# Patient Record
Sex: FEMALE | Race: OTHER | Employment: PART TIME | URBAN - METROPOLITAN AREA
[De-identification: names, ages, dates, MRNs, and addresses within clinical notes are randomized per-mention and may not be internally consistent; named-entity substitution may affect disease eponyms.]

---

## 2021-10-15 ENCOUNTER — HOSPITAL ENCOUNTER (OUTPATIENT)
Age: 46
Setting detail: OBSERVATION
LOS: 1 days | Discharge: HOME | End: 2021-10-16
Attending: INTERNAL MEDICINE | Admitting: INTERNAL MEDICINE
Payer: COMMERCIAL

## 2021-10-15 DIAGNOSIS — R07.2 PRECORDIAL PAIN: ICD-10-CM

## 2021-10-15 PROBLEM — R07.9 CHEST PAIN: Status: ACTIVE | Noted: 2021-10-15

## 2021-10-15 LAB
ATRIAL RATE: 60 BPM
CALCULATED P AXIS, ECG09: 62 DEGREES
CALCULATED R AXIS, ECG10: 75 DEGREES
CALCULATED T AXIS, ECG11: 68 DEGREES
DIAGNOSIS, 93000: NORMAL
GLUCOSE BLD STRIP.AUTO-MCNC: 92 MG/DL (ref 65–100)
P-R INTERVAL, ECG05: 156 MS
Q-T INTERVAL, ECG07: 418 MS
QRS DURATION, ECG06: 92 MS
QTC CALCULATION (BEZET), ECG08: 418 MS
SERVICE CMNT-IMP: NORMAL
TROPONIN-HIGH SENSITIVITY: 5.2 PG/ML (ref 0–14)
TROPONIN-HIGH SENSITIVITY: 5.7 PG/ML (ref 0–14)
VENTRICULAR RATE, ECG03: 60 BPM

## 2021-10-15 PROCEDURE — 99220 PR INITIAL OBSERVATION CARE/DAY 70 MINUTES: CPT | Performed by: INTERNAL MEDICINE

## 2021-10-15 PROCEDURE — 36415 COLL VENOUS BLD VENIPUNCTURE: CPT

## 2021-10-15 PROCEDURE — 93005 ELECTROCARDIOGRAM TRACING: CPT | Performed by: PHYSICIAN ASSISTANT

## 2021-10-15 PROCEDURE — 82962 GLUCOSE BLOOD TEST: CPT

## 2021-10-15 PROCEDURE — 84484 ASSAY OF TROPONIN QUANT: CPT

## 2021-10-15 PROCEDURE — 99218 HC RM OBSERVATION: CPT

## 2021-10-15 PROCEDURE — 2709999900 HC NON-CHARGEABLE SUPPLY

## 2021-10-15 RX ORDER — MORPHINE SULFATE 2 MG/ML
2 INJECTION, SOLUTION INTRAMUSCULAR; INTRAVENOUS
Status: DISCONTINUED | OUTPATIENT
Start: 2021-10-15 | End: 2021-10-16 | Stop reason: HOSPADM

## 2021-10-15 RX ORDER — HYDROCODONE BITARTRATE AND ACETAMINOPHEN 5; 325 MG/1; MG/1
1 TABLET ORAL
Status: DISCONTINUED | OUTPATIENT
Start: 2021-10-15 | End: 2021-10-16 | Stop reason: HOSPADM

## 2021-10-15 RX ORDER — NITROGLYCERIN 0.4 MG/1
0.4 TABLET SUBLINGUAL
Status: DISCONTINUED | OUTPATIENT
Start: 2021-10-15 | End: 2021-10-16 | Stop reason: HOSPADM

## 2021-10-15 RX ORDER — SODIUM CHLORIDE 0.9 % (FLUSH) 0.9 %
5-40 SYRINGE (ML) INJECTION EVERY 8 HOURS
Status: DISCONTINUED | OUTPATIENT
Start: 2021-10-15 | End: 2021-10-16 | Stop reason: HOSPADM

## 2021-10-15 RX ORDER — ACETAMINOPHEN 325 MG/1
650 TABLET ORAL
Status: DISCONTINUED | OUTPATIENT
Start: 2021-10-15 | End: 2021-10-16 | Stop reason: HOSPADM

## 2021-10-15 RX ADMIN — Medication 10 ML: at 21:10

## 2021-10-15 RX ADMIN — Medication 10 ML: at 18:21

## 2021-10-15 NOTE — ROUTINE PROCESS
Direct admission ambulatory to telemetry. MD and PA at bedside. Skin assessment:  Sacrum/heels intact with no redness noted.

## 2021-10-15 NOTE — PROGRESS NOTES
Patient seen and examined by me. Agree with above note by physician extender. Key findings are: Patient with a 1 to 2-minute episode of chest pressure yesterday afternoon. There were no aggravating or alleviating factors. Lasted only a few minutes at a time. She did not have any associated shortness of breath but did have some vomiting after the episode. She presented to outside emergency department today after developing numbness in her upper left arm with some mild discomfort in her chest.  She walked 30 minutes today without having any chest pain or shortness of breath. CV- RRR without murmur  Lungs- Clear bilaterally  Ext- no edema  EKG is normal    Plan: Chest pain. Atypical for myocardial ischemia with a normal EKG. Cycle serial cardiac enzymes and if negative stress testing will be appropriate. Patient is diabetic and I think should undergo early stress testing.

## 2021-10-15 NOTE — ROUTINE PROCESS
Bedside shift change report given to Tomas Plasencia RN (oncoming nurse) by myself (offgoing nurse). Report included the following information SBAR, Procedure Summary, Recent Results and Cardiac Rhythm . Raj Daily

## 2021-10-15 NOTE — H&P
[facility] Cardiology Initial Cardiac Evaluation      Date of  Admission: 10/15/2021  4:01 PM     Primary Care Physician: Vivi Pena MD  Primary Cardiologist: None  Referring Physician: Johanna Mckinnon  Supervising Physician: Dr Nelson Castro    CC/Reason for evaluation: chest pain    HPI:  Slim Mayo is a 55 y.o. female with PMH of diet controlled DM, pancreatic mass s/p partial pancreatectomy, pituitary tumor s/p subtotal resection, multidnodular goiter s/p partial thyroidectomy who presented to  with complaint of chest pain. Patient reports chest pain yesterday afternoon. Describes chest pain as pressure-like that lasted a couple minutes. Have nausea and vomiting x 1. No aggravating or alleviated factors. This morning patient states she walked and ran without any symptoms. Later in the day she developed left arm/hand tingling and had some chest discomfort therefore presented to  for further evaluation. Upon evaluation, EKG showed NSR. Labs showed WBC 5.5, H/H 13.8/40.3, Ptl 264, Na 137, K 4.5, BUN/Cr 14/0.7, glucose 126, troponin <0.05. Patient was transferred to [facility] for further cardiac evaluation. Currently chest pain free. Past Medical History:   Diagnosis Date    Anemia     Fatigue     Frequent urination     Gallstones     Heavy menses     Heavy menses     Irregular menstrual cycle     Shortness of breath     Vertigo       Past Surgical History:   Procedure Laterality Date    HX OTHER SURGICAL      removal of pituitary tumor       Allergies   Allergen Reactions    Pcn [Penicillins] Swelling      Social History     Socioeconomic History    Marital status: SINGLE     Spouse name: Not on file    Number of children: Not on file    Years of education: Not on file    Highest education level: Not on file   Occupational History    Not on file   Tobacco Use    Smoking status: Never Smoker    Smokeless tobacco: Never Used   Substance and Sexual Activity    Alcohol use:  No Alcohol/week: 0.0 standard drinks    Drug use: No    Sexual activity: Not on file   Other Topics Concern    Not on file   Social History Narrative    Not on file     Social Determinants of Health     Financial Resource Strain:     Difficulty of Paying Living Expenses:    Food Insecurity:     Worried About Running Out of Food in the Last Year:     [address] in the Last Year:    Transportation Needs:     Lack of Transportation (Medical):  Lack of Transportation (Non-Medical):    Physical Activity:     Days of Exercise per Week:     Minutes of Exercise per Session:    Stress:     Feeling of Stress :    Social Connections:     Frequency of Communication with Friends and Family:     Frequency of Social Gatherings with Friends and Family:     Attends Gnosticist Services:     Active Member of Clubs or Organizations:     Attends Club or Organization Meetings:     Marital Status:    Intimate Partner Violence:     Fear of Current or Ex-Partner:     Emotionally Abused:     Physically Abused:     Sexually Abused:      Family History   Problem Relation Age of Onset    Diabetes Maternal Grandmother     Diabetes Maternal Grandfather     Kidney Disease Maternal Grandfather     Anemia Other         aunt    Anemia Other         cousin        No current facility-administered medications for this encounter.      Review of Symptoms:    General: No weight changes,  weakness, fever or chills  Skin: no rashes, lumps, or other skin changes  HEENT: no headache, dizziness, lightheadedness, vision changes, hearing changes, tinnitus, vertigo, sinus pressure/pain, bleeding gums, sore throat, or hoarseness  Neck: no swollen glands, goiter, pain or stiffness  Respiratory: No cough, sputum, hemoptysis, dyspnea, wheezing  Cardiovascular: + as per HPI  Gastrointestinal: no GERD, constipation, diarrhea, liver problems, or h/o GI bleed  Urinary: no frequency, urgency , hematuria, burning/pain with urination, recent flank pain, polyuria, nocturia, or difficulty urinating  Peripheral Vascular: no claudication, leg cramps, prior DVTs, swelling of calves, legs, or feet, color change, or swelling with redness or tenderness  Musculoskeletal: no muscle or joint pain/stiffness, joint swelling, erythema of joints, or back pain  Psychiatric: no depression or excessive stress  Neurological: no sensory or motor loss, seizures, syncope, tremors, numbness, no dementia  Hematologic: no anemia, easy bruising or bleeding  Endocrine: Positive to diet controlled diabetes. No thyroid problems, heat or cold intolerance, excessive sweating, polyuria, polydipsia     Subjective:     Physical Exam:    Vitals:    10/15/21 1641   BP: 120/71   Pulse: 62   Resp: 18   Temp: 97.8 °F (36.6 °C)   SpO2: 97%       General: Well Developed, Well Nourished, No Acute Distress  HEENT: pupils equal and round, no abnormalities noted  Neck: supple, no JVD, no carotid bruits  Heart: S1S2 with RRR without murmurs or gallops  Lungs: Clear throughout auscultation bilaterally without adventitious sounds  Abd: soft, nontender, nondistended, with good bowel sounds  Ext: warm, no edema, calves supple/nontender, pulses 2+ bilaterally  Skin: warm and dry  Psychiatric: Normal mood and affect  Neurologic: Alert and oriented X 3    Cardiographics    Telemetry: normal sinus rhythm  ECG: normal sinus rhythm      Labs: as above    Pt has been seen and examined by Dr. Jodie Lozano. He agrees with the following assessment and plan. Assessment/Plan:      Chest pain  - appears atypical  - admit to telemetry  - trend hs-trop  - lipid panel in am  - NPO after midnight for stress test    DM  - diet controlled         Thank you for requesting cardiac evaluation and allowing us to participate in the care of this patient. We will continue to follow along with you.       Negro Wright PA-C  Supervising Physician: Dr oJdie Lozano

## 2021-10-16 VITALS
SYSTOLIC BLOOD PRESSURE: 110 MMHG | DIASTOLIC BLOOD PRESSURE: 73 MMHG | WEIGHT: 190.6 LBS | OXYGEN SATURATION: 98 % | RESPIRATION RATE: 20 BRPM | TEMPERATURE: 97.6 F | BODY MASS INDEX: 27.35 KG/M2 | HEART RATE: 55 BPM

## 2021-10-16 LAB
ANION GAP SERPL CALC-SCNC: 4 MMOL/L (ref 7–16)
BUN SERPL-MCNC: 14 MG/DL (ref 6–23)
CALCIUM SERPL-MCNC: 9.2 MG/DL (ref 8.3–10.4)
CHLORIDE SERPL-SCNC: 112 MMOL/L (ref 98–107)
CHOLEST SERPL-MCNC: 172 MG/DL
CO2 SERPL-SCNC: 25 MMOL/L (ref 21–32)
CREAT SERPL-MCNC: 0.56 MG/DL (ref 0.6–1)
ERYTHROCYTE [DISTWIDTH] IN BLOOD BY AUTOMATED COUNT: 15.1 % (ref 11.9–14.6)
GLUCOSE SERPL-MCNC: 124 MG/DL (ref 65–100)
HCT VFR BLD AUTO: 39.3 % (ref 35.8–46.3)
HDLC SERPL-MCNC: 59 MG/DL (ref 40–60)
HDLC SERPL: 2.9 {RATIO}
HGB BLD-MCNC: 12.8 G/DL (ref 11.7–15.4)
LDLC SERPL CALC-MCNC: 101 MG/DL
MCH RBC QN AUTO: 28.3 PG (ref 26.1–32.9)
MCHC RBC AUTO-ENTMCNC: 32.6 G/DL (ref 31.4–35)
MCV RBC AUTO: 86.8 FL (ref 79.6–97.8)
NRBC # BLD: 0 K/UL (ref 0–0.2)
PLATELET # BLD AUTO: 270 K/UL (ref 150–450)
PMV BLD AUTO: 10.8 FL (ref 9.4–12.3)
POTASSIUM SERPL-SCNC: 4 MMOL/L (ref 3.5–5.1)
RBC # BLD AUTO: 4.53 M/UL (ref 4.05–5.2)
SODIUM SERPL-SCNC: 141 MMOL/L (ref 136–145)
TRIGL SERPL-MCNC: 60 MG/DL (ref 35–150)
VLDLC SERPL CALC-MCNC: 12 MG/DL (ref 6–23)
WBC # BLD AUTO: 7 K/UL (ref 4.3–11.1)

## 2021-10-16 PROCEDURE — 80048 BASIC METABOLIC PNL TOTAL CA: CPT

## 2021-10-16 PROCEDURE — 36415 COLL VENOUS BLD VENIPUNCTURE: CPT

## 2021-10-16 PROCEDURE — 99217 PR OBSERVATION CARE DISCHARGE MANAGEMENT: CPT | Performed by: INTERNAL MEDICINE

## 2021-10-16 PROCEDURE — 80061 LIPID PANEL: CPT

## 2021-10-16 PROCEDURE — 85027 COMPLETE CBC AUTOMATED: CPT

## 2021-10-16 PROCEDURE — 99218 HC RM OBSERVATION: CPT

## 2021-10-16 RX ADMIN — Medication 10 ML: at 05:34

## 2021-10-16 NOTE — ROUTINE PROCESS
Bedside and Verbal shift change report given to myself (oncoming nurse) by Tonja Riley (offgoing nurse). Report included the following information SBAR, Kardex, Intake/Output, MAR, Recent Results and Med Rec Status.

## 2021-10-16 NOTE — PROGRESS NOTES
Discharge instructions reviewed with pt. Opportunity for questions provided. Pt voiced understanding of all discharge instructions. IV and tele box removed. Pt waiting on ride.

## 2021-10-16 NOTE — DISCHARGE SUMMARY
[address] Cardiology Discharge Summary     Patient ID:  Jarrod Tate  [id number]  55 y.o.  1975    Admit date: 10/15/2021    Discharge date:  10/16/2021    Admitting Physician: Elmira Sky MD     Discharge Physician: Nancy Pagan NP/Dr. Dayron Mack    Admission Diagnoses: Chest pain [R07.9]    Discharge Diagnoses:    Diagnosis    Chest pain    Iron deficiency anemia       Cardiology Procedures this admission:  None  Consults: None    Hospital Course: Patient presented to  with complaint of chest pain. Patient reports chest pain yesterday afternoon. Describes chest pain as pressure-like that lasted a couple minutes. Have nausea and vomiting x 1. No aggravating or alleviated factors. The morning of admission patient stated she walked and ran without any symptoms. Later in the day she developed left arm/hand tingling and had some chest discomfort therefore presented to [facility] for further evaluation. trop <0.05 @  and hs trop 5.2 and 5.7 here. Patient had no further chest pain. Given negative troponin levels x 3, normal EKG and no further symptoms the decision was made to discharge patient and have NST as OP. The morning of discharge, patient was up feeling well without any complaints of chest pain or shortness of breath. Patient will have nuclear stress test in office. DISPOSITION: The patient is being discharged home in stable condition on a low saturated fat, low cholesterol and low salt diet. . The patient is instructed to call the office or return to the ER for immediate evaluation for any shortness of breath or chest pain not relieved by NTG. Discharge Exam:   Visit Vitals  /73   Pulse (!) 55   Temp 97.6 °F (36.4 °C)   Resp 20   Wt 86.5 kg (190 lb 9.6 oz)   SpO2 98%   BMI 27.35 kg/m²     Patient has been seen by Dr. Dayron Mack: see his progress note for exam details.     Recent Results (from the past 24 hour(s))   EKG, 12 LEAD, INITIAL Collection Time: 10/15/21  4:26 PM   Result Value Ref Range    Ventricular Rate 60 BPM    Atrial Rate 60 BPM    P-R Interval 156 ms    QRS Duration 92 ms    Q-T Interval 418 ms    QTC Calculation (Bezet) 418 ms    Calculated P Axis 62 degrees    Calculated R Axis 75 degrees    Calculated T Axis 68 degrees    Diagnosis       Normal sinus rhythm  Cannot rule out Anterior infarct , age undetermined  Abnormal ECG  When compared with ECG of 24-DEC-2016 12:34,  No significant change was found  Confirmed by Karly Hodges ([id number]) on 10/15/2021 4:59:48 PM     GLUCOSE, POC    Collection Time: 10/15/21  4:27 PM   Result Value Ref Range    Glucose (POC) 92 65 - 100 mg/dL    Performed by [facility]    TROPONIN-HIGH SENSITIVITY    Collection Time: 10/15/21  4:37 PM   Result Value Ref Range    Troponin-High Sensitivity 5.2 0 - 14 pg/mL   TROPONIN-HIGH SENSITIVITY    Collection Time: 10/15/21  7:25 PM   Result Value Ref Range    Troponin-High Sensitivity 5.7 0 - 14 pg/mL   METABOLIC PANEL, BASIC    Collection Time: 10/16/21  4:59 AM   Result Value Ref Range    Sodium 141 136 - 145 mmol/L    Potassium 4.0 3.5 - 5.1 mmol/L    Chloride 112 (H) 98 - 107 mmol/L    CO2 25 21 - 32 mmol/L    Anion gap 4 (L) 7 - 16 mmol/L    Glucose 124 (H) 65 - 100 mg/dL    BUN 14 6 - 23 MG/DL    Creatinine 0.56 (L) 0.6 - 1.0 MG/DL    GFR est AA >60 >60 ml/min/1.73m2    GFR est non-AA >60 >60 ml/min/1.73m2    Calcium 9.2 8.3 - 10.4 MG/DL   LIPID PANEL    Collection Time: 10/16/21  4:59 AM   Result Value Ref Range    Cholesterol, total 172 <200 MG/DL    Triglyceride 60 35 - 150 MG/DL    HDL Cholesterol 59 40 - 60 MG/DL    LDL, calculated 101 (H) <100 MG/DL    VLDL, calculated 12 6.0 - 23.0 MG/DL    CHOL/HDL Ratio 2.9     CBC W/O DIFF    Collection Time: 10/16/21  4:59 AM   Result Value Ref Range    WBC 7.0 4.3 - 11.1 K/uL    RBC 4.53 4.05 - 5.2 M/uL    HGB 12.8 11.7 - 15.4 g/dL    HCT 39.3 35.8 - 46.3 %    MCV 86.8 79.6 - 97.8 FL    MCH 28.3 26.1 - 32.9 PG    MCHC 32.6 31.4 - 35.0 g/dL    RDW 15.1 (H) 11.9 - 14.6 %    PLATELET 255 029 - 813 K/uL    MPV 10.8 9.4 - 12.3 FL    ABSOLUTE NRBC 0.00 0.0 - 0.2 K/uL         Patient Instructions: There are no discharge medications for this patient.         Signed:  Kalyan Loo NP  10/16/2021  9:13 AM

## 2021-10-16 NOTE — DISCHARGE INSTRUCTIONS
Patient Education        Dolor de pecho: Instrucciones de cuidado  Chest Pain: Care Instructions  Instrucciones de cuidado    El dolor de pecho puede tener muchas causas. Algunas no son graves y mejorarán por sí solas en pocos días. William algunos tipos de dolor de pecho requieren más pruebas y [place]. Es posible que jerry médico le haya recomendado shavonne visita de seguimiento dentro de las 8 a 12 horas siguientes. Si no mejora, es posible que necesite [address] pruebas o [place]. Aunque jerry médico le haya dado de akash, es necesario que esté atento a cualquier problema que se presente. El médico le hizo un cuidadoso chequeo, william a veces los problemas pueden aparecer posteriormente. Si tiene nuevos síntomas o éstos no mejoran, obtenga atención médica de inmediato. Si tiene dolor o presión en el pecho que empeora o es diferente y que dura más de 5 minutos, o se desmayó (perdió el conocimiento), llame al 911 o busque otra ayuda de emergencia de inmediato. Acudir a shavonne consulta médica es sólo un paso en jerry tratamiento. Aunque se sienta mejor, todavía deberá hacer lo que jerry médico le recomiende, serina asistir a todas las visitas de seguimiento sugeridas y linda los medicamentos exactamente [employer]	[place] fueron indicados. [place] le ayudará a recuperarse y prevenir problemas futuros. ¿Cómo puede cuidarse en el hogar? · Descanse hasta que se sienta mejor. · Talmo galo medicamentos exactamente serina le fueron recetados. Llame a jerry médico si ziyad estar teniendo problemas con jerry medicamento. · No conduzca después de linda un analgésico (medicamento para el dolor) recetado. ¿Cuándo debe pedir ayuda? Llame al 911 si:     · Se desmayó (perdió el conocimiento).     · Tiene graves dificultades para respirar.     · Tiene síntomas de un ataque al corazón. Estos podrían incluir:  ? Vertis Ends en el pecho, o shavonne sensación extraña en el pecho.  ? Sudoración. ? Falta de aire. ? Náuseas o vómito.   ? Fredderick Mallet shavonne sensación extraña en la espalda, el haseeb, la mandíbula, la parte superior del abdomen o en isabella o ambos hombros o brazos. ? Aturdimiento o debilidad repentina. ? Latidos del corazón rápidos o irregulares. Después de llamar al 911, es posible que el operador le diga que mastique 1 aspirina para adultos o de 2 a 4 aspirinas de dosis baja. Espere a shavonne ambulancia. No intente conducir usted mismo. Llame hoy a jerry médico si:     · Tiene cualquier dificultad para respirar.     · El dolor en el pecho empeora.     · Siente mareos o aturdimiento, o que está a punto de desmayarse.     · No mejora serina se esperaba.     · Tiene dolor de pecho nuevo o diferente. ¿Dónde puede encontrar más información en inglés? Shavonne Govind a http://www.gray.com/  Escriba A120 en la búsqueda para aprender más acerca de \"Dolor de pecho: Instrucciones de cuidado. \"  Revisado: 1 julio, 9485               [facility] del contenido: 13.0  © 2006-2021 Healthwise, Incorporated. Las instrucciones de cuidado fueron adaptadas bajo licencia por [employer] (which disclaims liability or warranty for this information). Si usted tiene [place] afección médica o sobre estas instrucciones, siempre pregunte a jerry profesional de medhat. Healthwise, Incorporated niega toda garantía o responsabilidad por jerry uso de esta información. Patient Education        Gammagrafía de perfusión cardíaca: Acerca de esta prueba  Cardiac Perfusion Scan: About This Test  ¿Qué es? Shavonne gammagrafía de perfusión cardíaca mide la cantidad de lisa en el músculo cardíaco en reposo y después de que se haya obligado al corazón a realizar un esfuerzo. Pueden usarse medicamentos o ejercicio para aumentar la cantidad de lisa que el corazón necesita. Adrian la gammagrafía, shavonne cámara janelle imágenes del corazón después de que le introduzcan un marcador radiactivo en shavonne vena del brazo. El marcador se desplaza por la lisa hasta el corazón.  A medida que el marcador pasa por el corazón, las zonas que tienen shavonne buena irrigación sanguínea absorben el marcador. Las zonas que no absorben el marcador podrían no estar recibiendo suficiente lisa o rojas resultado dañadas debido a un ataque cardíaco. Las imágenes muestran la diferencia. Se pueden linda dos series de imágenes syed la prueba. Heaven Reek serie se janelle mientras usted está en reposo. Otra serie se janelle después de que se haya obligado al corazón a realizar un mayor esfuerzo (esto se llama prueba de esfuerzo). ¿Por qué se hace esta prueba? La prueba suele hacerse para averiguar qué puede estar causando el dolor o la presión en el pecho. Puede hacerse después de un ataque cardíaco para kael si hay zonas del corazón que no reciben suficiente lisa. También puede usarse para averiguar la extensión del daño al corazón debido al ataque cardíaco.  ¿Cómo se prepara usted para la prueba? Informe a jerry médico [employer], vitaminas, suplementos y boston herbarios que janelle. Algunos pueden aumentar el riesgo de problemas syed jerry prueba. Jerry médico le dirá si debería dejar de linda cualquiera de ellos antes de la prueba y cuándo debe hacerlo. Si janelle aspirina o cualquier otro medicamento que previene los coágulos de [place]ole[place], pregúntele a jerry médico si debería dejar de tomarlo antes de jerry prueba. Asegúrese de entender exactamente lo que jerry médico quiere que prem. Estos medicamentos aumentan el riesgo de sangrado. Tayler vez le indiquen que no coma ni johanna syed varias horas antes de la prueba. Es posible que le pidan que evite el alcohol, el tabaco y las bebidas que contienen cafeína syed al menos 24 horas antes de la prueba. Use calzado cómodo, serina zapatillas para correr y [employer] o cortos que gulshan holgados. No lleve puestas joyas para esta prueba.   Si está en período de lactancia, tayler vez desee sacarse suficiente leche antes de la prueba para que pueda alimentar a jerry bebé syed 1 o 2 dillon. El marcador radiactivo utilizado en esta prueba puede ingresar en la [employer] y no es cm para el bebé. ¿Cómo se hace la prueba? Sangeeta Yo de perfusión cardíaca puede hacerse mientras está en reposo, después de hacer ejercicio o después de linda un medicamento. O pueden hacerle la prueba después de linda un medicamento y hacer ejercicio. Antes de las exploraciones, le conectarán electrodos al pecho para ayudar a registrar los latidos cardíacos. Benuel Lunch un tubo, llamado vía intravenosa, en el brazo. Le introducirán un marcador radiactivo en la vía intravenosa. · Para la exploración en reposo, se recostará sobre shavonne mireles. Lucia Councilman sobre el pecho registrará el marcador que se ha desplazado por la lisa hasta el músculo cardíaco. Se tomarán varias proyecciones. Cada shavonne lleva entre 10 y 27 minutos. · Para shavonne exploración de esfuerzo, caminará sobre un caminador mecánico o pedaleará en shavonne Brooklynn Mote. A continuación le harán la exploración. · Para shavonne exploración con [employer], le administrarán un medicamento en la vía intravenosa que aumenta la cantidad de lisa que necesita el corazón. Entonces le harán la exploración. ¿Cuánto tiempo dura shavonne gammagrafía de perfusión cardíaca? · Cada gammagrafía puede durar entre 27 y 61 minutos, aproximadamente. · El tiempo que tome la prueba dependerá de cuántas gammagrafías le heather y cuánto tiempo espere entre ellas. ¿Cuáles son los riesgos de shavonne gammagrafía de perfusión cardíaca? Las gammagrafías de perfusión cardíaca suelen ser [place]. Lajuanda Herberth que se exponga a radiación, existe shavonne pequeña probabilidad de daño a células o tejidos. [place] es el zacarias incluso con el marcador radioactivo de bajo nivel que se [place] para esta prueba. William la probabilidad de daño es muy baja en comparación con los beneficios de la prueba. Habrá algunos riesgos cuando la prueba use el ejercicio o medicamentos para hacer que el corazón se esfuerce.  El Faustina de riesgo depende del estado de jerry corazón y de jerry estado de medhat general. Los riesgos incluyen:  · [employer]. · Dolor en el pecho. · Un latido cardíaco irregular. · Ataque cardíaco. Hay un ligero riesgo de muerte si se produce un ataque cardíaco syed la prueba. ¿Qué ocurre después de la prueba? Puede regresar a casa y retomar galo actividades habituales de inmediato, a menos que ya esté ingresado en el hospital.  ¿Cómo puede cuidarse en el hogar? · [place] mucho líquido syed las siguientes 24 horas para ayudar a eliminar el marcador del organismo. Si tiene shavonne enfermedad renal, cardíaca o hepática y tiene que restringir los líquidos, hable con jerry médico antes de aumentar la cantidad de líquido que charli. · Usted eliminará la mayor parte del marcador del organismo a través de la orina o las heces en el término de un día. Por lo tanto, asegúrese de tirar la shaffer inmediatamente después de usar el baño y de lavarse bello las stanislav con agua y Cris. La cantidad de radiación en el marcador es muy pequeña. Eso significa que no es un riesgo para las personas que estén en contacto con usted después de la prueba. · No amamante a jerry bebé por 1 o 2 días después de esta prueba. Syed choco [place], puede darle a jerry bebé [facility] que haya barboza antes de la prueba, o puede darle [place]. ¿Cuándo debe pedir ayuda? Llame al 911 en cualquier momento que considere que necesita atención de [place]. Por ejemplo, llame si:  · Se desmayó (perdió el conocimiento). · Le dooley diagnosticado angina y tiene síntomas de angina que no desaparecen con reposo o que no mejoran a los 5 minutos de rojas tomado shavonne dosis de nitroglicerina. · Tiene síntomas de un ataque al corazón. Estos pueden incluir:  ? Gillie Pacific en el pecho, o shavonne sensación extraña en el pecho.  ? Sudoración. ? Falta de aire. ? Náuseas o vómito.   ? Dolor, presión o shavonne sensación extraña en la espalda, el haseeb, la mandíbula o la parte superior del abdomen o en isabella o ambos hombros o brazos. ? Aturdimiento o debilidad repentina. ? Latidos cardíacos rápidos o irregulares. Después de llamar al 911, el operador puede decirle que [place] 1 aspirina para adultos o de 2 a 4 aspirinas de dosis baja. Espere la ambulancia. No trate de conducir por sí mismo. Preste especial atención a los cambios en jerry medhat y asegúrese de comunicarse con jerry médico si tiene algún problema. La atención de seguimiento es shavonne parte clave de jerry tratamiento y seguridad. Asegúrese de hacer y acudir a todas las citas, y llame a jerry médico si está teniendo problemas. También es shavonne buena idea mantener shavonne lista de los medicamentos que janelle. Pregúntele a jerry médico cuándo espera American Keek de la prueba. ¿Dónde puede encontrar más información en inglés? Vaya a http://www.gray.[employer]/  [place] T239 en la búsqueda para aprender más acerca de \"Gammagrafía de perfusión cardíaca: Acerca de esta prueba. \"  Revisado: 29 abril, 2021               Versión del contenido: 13.0  © 2006-2021 Healthwise, Incorporated. Las instrucciones de cuidado fueron adaptadas bajo licencia por [employer] (which disclaims liability or warranty for this information). Si usted tiene [place] afección médica o sobre estas instrucciones, siempre pregunte a jerry profesional de medhat. Healthwise, Incorporated niega toda garantía o responsabilidad por jerry uso de esta información.

## 2021-10-16 NOTE — PROGRESS NOTES
Problem: Patient Education: Go to Patient Education Activity  Goal: Patient/Family Education  Outcome: Resolved/Met     Problem: Unstable angina/NSTEMI: Day of Admission/Day 1  Goal: Off Pathway (Use only if patient is Off Pathway)  Outcome: Resolved/Met  Goal: Activity/Safety  Outcome: Resolved/Met  Goal: Consults, if ordered  Outcome: Resolved/Met  Goal: Diagnostic Test/Procedures  Outcome: Resolved/Met  Goal: Nutrition/Diet  Outcome: Resolved/Met  Goal: Discharge Planning  Outcome: Resolved/Met  Goal: Medications  Outcome: Resolved/Met  Goal: Respiratory  Outcome: Resolved/Met  Goal: Treatments/Interventions/Procedures  Outcome: Resolved/Met  Goal: Psychosocial  Outcome: Resolved/Met  Goal: *Hemodynamically stable  Outcome: Resolved/Met  Goal: *Optimal pain control at patient's stated goal  Outcome: Resolved/Met  Goal: *Lungs clear or at baseline  Outcome: Resolved/Met     Problem: Falls - Risk of  Goal: *Absence of Falls  Description: Document Carlos Fall Risk and appropriate interventions in the flowsheet.   10/16/2021 0955 by Slava Medina  Outcome: Resolved/Met  Note: Fall Risk Interventions:            Medication Interventions: Assess postural VS orthostatic hypotension, Patient to call before getting OOB, Evaluate medications/consider consulting pharmacy, Teach patient to arise slowly                10/16/2021 0955 by Slava Medina  Outcome: Progressing Towards Goal  Note: Fall Risk Interventions:            Medication Interventions: Assess postural VS orthostatic hypotension, Patient to call before getting OOB, Evaluate medications/consider consulting pharmacy, Teach patient to arise slowly                   Problem: Patient Education: Go to Patient Education Activity  Goal: Patient/Family Education  Outcome: Resolved/Met

## 2021-10-16 NOTE — PROGRESS NOTES
Chart screened by  for potential discharge needs or concerns. Pt was medically cleared for dc to home today. No dc needs or concerns identified or reported. Care Management Interventions  PCP Verified by CM: Yes (virtual visit)  Last Visit to PCP: 06/02/21  Mode of Transport at Discharge:  Other (see comment) (family)  Discharge Durable Medical Equipment: No  Physical Therapy Consult: No  Occupational Therapy Consult: No  Speech Therapy Consult: No  Support Systems: Spouse/Significant Other, Child(hilda)  Confirm Follow Up Transport: Self  Discharge Location  Discharge Placement: Home

## 2021-10-16 NOTE — PROGRESS NOTES
[facility] Cardiology Initial Cardiac Evaluation      Date of  Admission: 10/15/2021  4:01 PM     Primary Care Physician: Moses Fleischer, MD  Primary Cardiologist: None  Referring Physician: Vincent Castro  Supervising Physician: Dr Jodie Lozano    CC/Reason for evaluation: chest pain    HPI:  Bindu Wood is a 55 y.o. female with PMH of diet controlled DM, pancreatic mass s/p partial pancreatectomy, pituitary tumor s/p subtotal resection, multidnodular goiter s/p partial thyroidectomy who presented to [facility] with complaint of chest pain. Patient reports chest pain yesterday afternoon. Describes chest pain as pressure-like that lasted a couple minutes. Have nausea and vomiting x 1. No aggravating or alleviated factors. This morning patient states she walked and ran without any symptoms. Later in the day she developed left arm/hand tingling and had some chest discomfort therefore presented to [facility] for further evaluation. Upon evaluation, EKG showed NSR. Labs showed WBC 5.5, H/H 13.8/40.3, Ptl 264, Na 137, K 4.5, BUN/Cr 14/0.7, glucose 126, troponin <0.05. Patient was transferred to [facility] for further cardiac evaluation. Currently chest pain free. Past Medical History:   Diagnosis Date    Anemia     Fatigue     Frequent urination     Gallstones     Heavy menses     Heavy menses     Irregular menstrual cycle     Shortness of breath     Vertigo       Past Surgical History:   Procedure Laterality Date    HX OTHER SURGICAL      removal of pituitary tumor       Allergies   Allergen Reactions    Pcn [Penicillins] Swelling      Social History     Socioeconomic History    Marital status: SINGLE     Spouse name: Not on file    Number of children: Not on file    Years of education: Not on file    Highest education level: Not on file   Occupational History    Not on file   Tobacco Use    Smoking status: Never Smoker    Smokeless tobacco: Never Used   Substance and Sexual Activity    Alcohol use:  No Alcohol/week: 0.0 standard drinks    Drug use: No    Sexual activity: Not on file   Other Topics Concern    Not on file   Social History Narrative    Not on file     Social Determinants of Health     Financial Resource Strain:     Difficulty of Paying Living Expenses:    Food Insecurity:     Worried About Running Out of Food in the Last Year:     [address] in the Last Year:    Transportation Needs:     Lack of Transportation (Medical):      Lack of Transportation (Non-Medical):    Physical Activity:     Days of Exercise per Week:     Minutes of Exercise per Session:    Stress:     Feeling of Stress :    Social Connections:     Frequency of Communication with Friends and Family:     Frequency of Social Gatherings with Friends and Family:     Attends Spiritism Services:     Active Member of Clubs or Organizations:     Attends Club or Organization Meetings:     Marital Status:    Intimate Partner Violence:     Fear of Current or Ex-Partner:     Emotionally Abused:     Physically Abused:     Sexually Abused:      Family History   Problem Relation Age of Onset    Diabetes Maternal Grandmother     Diabetes Maternal Grandfather     Kidney Disease Maternal Grandfather     Anemia Other         aunt    Anemia Other         cousin        Current Facility-Administered Medications   Medication Dose Route Frequency    sodium chloride (NS) flush 5-40 mL  5-40 mL IntraVENous Q8H    nitroglycerin (NITROSTAT) tablet 0.4 mg  0.4 mg SubLINGual Q5MIN PRN    acetaminophen (TYLENOL) tablet 650 mg  650 mg Oral Q4H PRN    HYDROcodone-acetaminophen (NORCO) 5-325 mg per tablet 1 Tablet  1 Tablet Oral Q4H PRN    morphine injection 2 mg  2 mg IntraVENous Q4H PRN     Review of Symptoms:    General: No weight changes,  weakness, fever or chills  Skin: no rashes, lumps, or other skin changes  HEENT: no headache, dizziness, lightheadedness, vision changes, hearing changes, tinnitus, vertigo, sinus pressure/pain, bleeding gums, sore throat, or hoarseness  Neck: no swollen glands, goiter, pain or stiffness  Respiratory: No cough, sputum, hemoptysis, dyspnea, wheezing. Denies shortness of breath. Cardiovascular: denies chest pain since ED arrival. Denies palpitations. Gastrointestinal: no GERD, constipation, diarrhea, liver problems, or h/o GI bleed  Urinary: no frequency, urgency , hematuria, burning/pain with urination, recent flank pain, polyuria, nocturia, or difficulty urinating  Peripheral Vascular: no claudication, leg cramps, prior DVTs, swelling of calves, legs, or feet, color change, or swelling with redness or tenderness  Musculoskeletal: no muscle or joint pain/stiffness, joint swelling, erythema of joints, or back pain  Psychiatric: no depression or excessive stress  Neurological: no sensory or motor loss, seizures, syncope, tremors, numbness, no dementia  Hematologic: no anemia, easy bruising or bleeding  Endocrine: Positive to diet controlled diabetes.  No thyroid problems, heat or cold intolerance, excessive sweating, polyuria, polydipsia     Subjective:     Physical Exam:    Vitals:    10/15/21 2015 10/16/21 0053 10/16/21 0446 10/16/21 0754   BP: 114/75 99/72 92/64 110/73   Pulse: (!) 57 (!) 57 60 (!) 55   Resp: 16 16 16 20   Temp: 97.5 °F (36.4 °C) 97.7 °F (36.5 °C) 98 °F (36.7 °C) 97.6 °F (36.4 °C)   SpO2: 99% 98% 98% 98%   Weight:   86.5 kg (190 lb 9.6 oz)        General: Well Developed, Well Nourished, No Acute Distress  HEENT: pupils equal and round, no abnormalities noted  Neck: supple, no JVD, no carotid bruits  Heart: S1S2 with RRR without murmurs or gallops  Lungs: Clear throughout auscultation bilaterally without adventitious sounds  Abd: soft, nontender, nondistended, with good bowel sounds  Ext: warm, no edema, calves supple/nontender, pulses 2+ bilaterally  Skin: warm and dry  Psychiatric: Normal mood and affect  Neurologic: Alert and oriented X 3    Cardiographics    Telemetry: normal sinus rhythm with PVCs  ECG: normal sinus rhythm      Labs: as above    Pt has been seen and examined by Dr. Kaylene Domínguez. He agrees with the following assessment and plan. Assessment/Plan:      Chest pain  - appears atypical  - admit to telemetry  - trend hs-trop  - lipid panel in am  - NPO after midnight for stress test    DM  - diet controlled     Patient had two consecutive negative troponins. Patient will be worked up with inpatient [facility]rera today. [facility]  Supervising Physician: Dr Kristina Villanueva:  This note has been created by a medical student for educational purposes only. Please do not refer to the content of this note for clinical decision-making, billing, or other purposes. Please see attending physicians note to obtain clinical information on this patient. *

## 2021-10-16 NOTE — PROGRESS NOTES
Problem: Falls - Risk of  Goal: *Absence of Falls  Description: Document Rita Chatman Fall Risk and appropriate interventions in the flowsheet.   Outcome: Progressing Towards Goal  Note: Fall Risk Interventions:            Medication Interventions: Assess postural VS orthostatic hypotension, Patient to call before getting OOB, Evaluate medications/consider consulting pharmacy, Teach patient to arise slowly

## 2021-10-16 NOTE — ROUTINE PROCESS
Bedside and Verbal shift change report received from [place], [facility]. Report included the following information SBAR, Kardex, Intake/Output, MAR and Recent Results.